# Patient Record
Sex: FEMALE | Race: WHITE
[De-identification: names, ages, dates, MRNs, and addresses within clinical notes are randomized per-mention and may not be internally consistent; named-entity substitution may affect disease eponyms.]

---

## 2021-02-24 ENCOUNTER — HOSPITAL ENCOUNTER (OUTPATIENT)
Dept: HOSPITAL 92 - BICCT | Age: 31
Discharge: HOME | End: 2021-02-24
Attending: FAMILY MEDICINE
Payer: COMMERCIAL

## 2021-02-24 DIAGNOSIS — R31.29: ICD-10-CM

## 2021-02-24 DIAGNOSIS — R10.13: Primary | ICD-10-CM

## 2021-02-24 DIAGNOSIS — R11.2: ICD-10-CM

## 2021-02-24 PROCEDURE — 74177 CT ABD & PELVIS W/CONTRAST: CPT

## 2021-02-24 NOTE — CT
CT abdomen and pelvis with IV contrast



HISTORY: Abdomen pain. Nausea vomiting.



FINDINGS: The lung bases are clear. Postoperative changes of the stomach with the appearance of prior
 bariatric surgery.



Gallbladder is surgically absent. Solid organs are intact.



No evidence of bowel obstruction or inflammation. Appendix not visualized, possibly surgically absent
. Nonenlarged, reactive appearing mesenteric lymph nodes within the right lower quadrant.



Follicles of the ovaries. Physiologic amount of free fluid within the cul-de-sac.



  



IMPRESSION :

No acute abnormalities are demonstrated.



Reported By: CECY Sparks 

Electronically Signed:  2/24/2021 11:51 AM

## 2022-03-02 ENCOUNTER — HOSPITAL ENCOUNTER (INPATIENT)
Dept: HOSPITAL 92 - CSHLD | Age: 32
LOS: 2 days | Discharge: HOME | End: 2022-03-04
Attending: OBSTETRICS & GYNECOLOGY | Admitting: OBSTETRICS & GYNECOLOGY
Payer: SELF-PAY

## 2022-03-02 VITALS — BODY MASS INDEX: 35.4 KG/M2

## 2022-03-02 DIAGNOSIS — Z3A.38: ICD-10-CM

## 2022-03-02 DIAGNOSIS — Z90.49: ICD-10-CM

## 2022-03-02 DIAGNOSIS — Z20.822: ICD-10-CM

## 2022-03-02 LAB
HBSAG INDEX: 0.16 S/CO (ref 0–0.99)
HGB BLD-MCNC: 11.6 G/DL (ref 12–15.5)
HIV 1/2 INDEX: 0.09 S/CO (ref ?–1)
MCH RBC QN AUTO: 28.4 PG (ref 27–33)
MCV RBC AUTO: 88 FL (ref 81.6–98.3)
PLATELET # BLD AUTO: 325 10X3/UL (ref 150–450)
RBC # BLD AUTO: 4.09 10X6/UL (ref 3.9–5.03)
SYPHILIS ANTIBODY INDEX: 0.04 S/CO
WBC # BLD AUTO: 11.8 10X3/UL (ref 3.5–10.5)

## 2022-03-02 PROCEDURE — 86901 BLOOD TYPING SEROLOGIC RH(D): CPT

## 2022-03-02 PROCEDURE — 86850 RBC ANTIBODY SCREEN: CPT

## 2022-03-02 PROCEDURE — 87340 HEPATITIS B SURFACE AG IA: CPT

## 2022-03-02 PROCEDURE — 86900 BLOOD TYPING SEROLOGIC ABO: CPT

## 2022-03-02 PROCEDURE — 51702 INSERT TEMP BLADDER CATH: CPT

## 2022-03-02 PROCEDURE — 87389 HIV-1 AG W/HIV-1&-2 AB AG IA: CPT

## 2022-03-02 PROCEDURE — 86780 TREPONEMA PALLIDUM: CPT

## 2022-03-02 PROCEDURE — U0002 COVID-19 LAB TEST NON-CDC: HCPCS

## 2022-03-02 PROCEDURE — 36415 COLL VENOUS BLD VENIPUNCTURE: CPT

## 2022-03-02 PROCEDURE — 85027 COMPLETE CBC AUTOMATED: CPT

## 2022-03-02 RX ADMIN — ONDANSETRON PRN MG: 2 INJECTION INTRAMUSCULAR; INTRAVENOUS at 22:59

## 2022-03-02 RX ADMIN — Medication SCH ML: at 22:52

## 2022-03-03 RX ADMIN — ONDANSETRON PRN MG: 2 INJECTION INTRAMUSCULAR; INTRAVENOUS at 05:02

## 2022-03-03 RX ADMIN — Medication SCH ML: at 04:44

## 2022-03-04 VITALS — SYSTOLIC BLOOD PRESSURE: 105 MMHG | DIASTOLIC BLOOD PRESSURE: 56 MMHG | TEMPERATURE: 98.1 F

## 2022-08-16 ENCOUNTER — HOSPITAL ENCOUNTER (OUTPATIENT)
Dept: HOSPITAL 92 - MRI | Age: 32
Discharge: HOME | End: 2022-08-16
Attending: INTERNAL MEDICINE
Payer: COMMERCIAL

## 2022-08-16 DIAGNOSIS — Z87.19: ICD-10-CM

## 2022-08-16 DIAGNOSIS — R10.10: Primary | ICD-10-CM

## 2022-08-16 DIAGNOSIS — Z90.3: ICD-10-CM

## 2022-08-16 PROCEDURE — A9579 GAD-BASE MR CONTRAST NOS,1ML: HCPCS

## 2022-08-16 PROCEDURE — 74183 MRI ABD W/O CNTR FLWD CNTR: CPT

## 2023-10-11 ENCOUNTER — HOSPITAL ENCOUNTER (INPATIENT)
Dept: HOSPITAL 92 - CSHLD | Age: 33
LOS: 1 days | Discharge: HOME | End: 2023-10-12
Attending: OBSTETRICS & GYNECOLOGY | Admitting: OBSTETRICS & GYNECOLOGY
Payer: COMMERCIAL

## 2023-10-11 DIAGNOSIS — Z3A.38: ICD-10-CM

## 2023-10-11 DIAGNOSIS — N80.9: ICD-10-CM

## 2023-10-11 DIAGNOSIS — E28.2: ICD-10-CM

## 2023-10-11 LAB
HBSAG INDEX: 0.15 S/CO (ref 0–0.99)
HCT VFR BLD CALC: 37.2 % (ref 34.9–44.5)
HGB BLD-MCNC: 11.9 G/DL (ref 12–15.5)
HIV 1/2 INDEX: 0.11 S/CO (ref ?–1)
MCH RBC QN AUTO: 26.9 PG (ref 27–33)
MCV RBC AUTO: 84 FL (ref 81.6–98.3)
PLATELET # BLD AUTO: 311 10X3/UL (ref 150–450)
RBC # BLD AUTO: 4.43 10X6/UL (ref 3.9–5.03)
SYPHILIS ANTIBODY INDEX: 0.03 S/CO
WBC # BLD AUTO: 13.8 10X3/UL (ref 3.5–10.5)

## 2023-10-11 PROCEDURE — 86780 TREPONEMA PALLIDUM: CPT

## 2023-10-11 PROCEDURE — 87389 HIV-1 AG W/HIV-1&-2 AB AG IA: CPT

## 2023-10-11 PROCEDURE — 86850 RBC ANTIBODY SCREEN: CPT

## 2023-10-11 PROCEDURE — 10907ZC DRAINAGE OF AMNIOTIC FLUID, THERAPEUTIC FROM PRODUCTS OF CONCEPTION, VIA NATURAL OR ARTIFICIAL OPENING: ICD-10-PCS | Performed by: OBSTETRICS & GYNECOLOGY

## 2023-10-11 PROCEDURE — S0020 INJECTION, BUPIVICAINE HYDRO: HCPCS

## 2023-10-11 PROCEDURE — 87340 HEPATITIS B SURFACE AG IA: CPT

## 2023-10-11 PROCEDURE — 85027 COMPLETE CBC AUTOMATED: CPT

## 2023-10-11 PROCEDURE — 86900 BLOOD TYPING SEROLOGIC ABO: CPT

## 2023-10-11 PROCEDURE — 10H07YZ INSERTION OF OTHER DEVICE INTO PRODUCTS OF CONCEPTION, VIA NATURAL OR ARTIFICIAL OPENING: ICD-10-PCS | Performed by: OBSTETRICS & GYNECOLOGY

## 2023-10-11 PROCEDURE — 51702 INSERT TEMP BLADDER CATH: CPT

## 2023-10-11 PROCEDURE — 86901 BLOOD TYPING SEROLOGIC RH(D): CPT

## 2023-10-12 VITALS — DIASTOLIC BLOOD PRESSURE: 52 MMHG | SYSTOLIC BLOOD PRESSURE: 111 MMHG | TEMPERATURE: 97.6 F

## 2023-10-12 RX ADMIN — HYDROCODONE BITARTRATE AND ACETAMINOPHEN PRN TAB: 5; 325 TABLET ORAL at 03:56

## 2023-10-12 RX ADMIN — HYDROCODONE BITARTRATE AND ACETAMINOPHEN PRN TAB: 5; 325 TABLET ORAL at 02:20

## 2023-10-12 RX ADMIN — HYDROCODONE BITARTRATE AND ACETAMINOPHEN PRN TAB: 5; 325 TABLET ORAL at 11:44

## 2023-10-12 RX ADMIN — HYDROCODONE BITARTRATE AND ACETAMINOPHEN PRN TAB: 5; 325 TABLET ORAL at 15:54
